# Patient Record
Sex: MALE | Race: WHITE | NOT HISPANIC OR LATINO | Employment: UNEMPLOYED | URBAN - METROPOLITAN AREA
[De-identification: names, ages, dates, MRNs, and addresses within clinical notes are randomized per-mention and may not be internally consistent; named-entity substitution may affect disease eponyms.]

---

## 2018-03-05 ENCOUNTER — OFFICE VISIT (OUTPATIENT)
Dept: URGENT CARE | Facility: CLINIC | Age: 28
End: 2018-03-05
Payer: COMMERCIAL

## 2018-03-05 VITALS
RESPIRATION RATE: 18 BRPM | OXYGEN SATURATION: 96 % | SYSTOLIC BLOOD PRESSURE: 98 MMHG | BODY MASS INDEX: 31.99 KG/M2 | WEIGHT: 216 LBS | HEART RATE: 107 BPM | HEIGHT: 69 IN | DIASTOLIC BLOOD PRESSURE: 62 MMHG | TEMPERATURE: 100 F

## 2018-03-05 DIAGNOSIS — R14.0 ABDOMINAL BLOATING: ICD-10-CM

## 2018-03-05 DIAGNOSIS — R09.81 NASAL CONGESTION: ICD-10-CM

## 2018-03-05 DIAGNOSIS — K52.9 GASTROENTERITIS: ICD-10-CM

## 2018-03-05 DIAGNOSIS — B34.9 VIRAL SYNDROME: Primary | ICD-10-CM

## 2018-03-05 LAB
BILIRUB UR QL STRIP: NEGATIVE
CTP QC/QA: YES
FLUAV AG NPH QL: NEGATIVE
FLUBV AG NPH QL: NEGATIVE
GLUCOSE UR QL STRIP: NEGATIVE
KETONES UR QL STRIP: NEGATIVE
LEUKOCYTE ESTERASE UR QL STRIP: POSITIVE
PH, POC UA: 7.5 (ref 5–8)
POC BLOOD, URINE: NEGATIVE
POC NITRATES, URINE: NEGATIVE
PROT UR QL STRIP: NEGATIVE
SP GR UR STRIP: 1.01 (ref 1–1.03)
UROBILINOGEN UR STRIP-ACNC: ABNORMAL (ref 0.3–2.2)

## 2018-03-05 PROCEDURE — 99203 OFFICE O/P NEW LOW 30 MIN: CPT | Mod: 25,S$GLB,, | Performed by: PHYSICIAN ASSISTANT

## 2018-03-05 PROCEDURE — 81003 URINALYSIS AUTO W/O SCOPE: CPT | Mod: QW,S$GLB,, | Performed by: PHYSICIAN ASSISTANT

## 2018-03-05 PROCEDURE — 87804 INFLUENZA ASSAY W/OPTIC: CPT | Mod: 59,QW,S$GLB, | Performed by: PHYSICIAN ASSISTANT

## 2018-03-05 RX ORDER — HYOSCYAMINE SULFATE 0.125 MG
125 TABLET ORAL EVERY 6 HOURS PRN
Qty: 20 TABLET | Refills: 0 | Status: SHIPPED | OUTPATIENT
Start: 2018-03-05 | End: 2018-07-10

## 2018-03-05 RX ORDER — DIPHENOXYLATE HYDROCHLORIDE AND ATROPINE SULFATE 2.5; .025 MG/1; MG/1
1 TABLET ORAL 4 TIMES DAILY PRN
Qty: 20 TABLET | Refills: 0 | Status: SHIPPED | OUTPATIENT
Start: 2018-03-05 | End: 2018-03-10

## 2018-03-05 RX ORDER — ONDANSETRON 8 MG/1
8 TABLET, ORALLY DISINTEGRATING ORAL EVERY 8 HOURS PRN
Qty: 15 TABLET | Refills: 0 | Status: SHIPPED | OUTPATIENT
Start: 2018-03-05 | End: 2018-03-10

## 2018-03-05 NOTE — LETTER
March 5, 2018      [facility] [place]  [address]  [facility] [address]  Phone: [phone]  Fax: [phone]       Patient: Dustin Schwab   YOB: 1990  Date of Visit: 03/05/2018    To Whom It May Concern:    Leo Schwab  was at [facility] on 03/05/2018. He may return to work/school on 3/6 , 3/7, or 3/8/2018 depending on how feeling with no restrictions. If you have any questions or concerns, or if I can be of further assistance, please do not hesitate to contact me.    Sincerely,       Raz Matute PA-C

## 2018-03-05 NOTE — PATIENT INSTRUCTIONS
"    - Please return here or go to the Emergency Department for any concerns or worsening of condition.   - If you were prescribed a narcotic medication, do not drive or operate heavy equipment or machinery while taking these medications.  - Please follow up with your primary care provider (PCP) or discussed specialist(s) as needed.           Viral Syndrome (Adult)  A viral illness may cause a number of symptoms. The symptoms depend on the part of the body that the virus affects. If it settles in your nose, throat, and lungs, it may cause cough, sore throat, congestion, and sometimes headache. If it settles in your stomach and intestinal tract, it may cause vomiting and diarrhea. Sometimes it causes vague symptoms like "aching all over," feeling tired, loss of appetite, or fever.  A viral illness usually lasts 1 to 2 weeks, but sometimes it lasts longer. In some cases, a more serious infection can look like a viral syndrome in the first few days of the illness. You may need another exam and additional tests to know the difference. Watch for the warning signs listed below.  Home care  Follow these guidelines for taking care of yourself at home:  · If symptoms are severe, rest at home for the first 2 to 3 days.  · Stay away from cigarette smoke - both your smoke and the smoke from others.  · You may use over-the-counter acetaminophen or ibuprofen for fever, muscle aching, and headache, unless another medicine was prescribed for this. If you have chronic liver or kidney disease or ever had a stomach ulcer or GI bleeding, talk with your doctor before using these medicines. No one who is younger than 18 and ill with a fever should take aspirin. It may cause severe disease or death.  · Your appetite may be poor, so a light diet is fine. Avoid dehydration by drinking 8 to 12 8-ounce glasses of fluids each day. This may include water; orange juice; lemonade; apple, grape, and cranberry juice; clear fruit drinks; electrolyte " replacement and sports drinks; and decaffeinated teas and coffee. If you have been diagnosed with a kidney disease, ask your doctor how much and what types of fluids you should drink to prevent dehydration. If you have kidney disease, drinking too much fluid can cause it build up in the your body and be dangerous to your health.  · Over-the-counter remedies won't shorten the length of the illness but may be helpful for cough, sore throat; and nasal and sinus congestion. Don't use decongestants if you have high blood pressure.  Follow-up care  Follow up with your healthcare provider if you do not improve over the next week.  Call 911  Get emergency medical care if any of the following occur:  · Convulsion  · Feeling weak, dizzy, or like you are going to faint  · Chest pain, shortness of breath, wheezing, or difficulty breathing  When to seek medical advice  Call your healthcare provider right away if any of these occur:  · Cough with lots of colored sputum (mucus) or blood in your sputum  · Chest pain, shortness of breath, wheezing, or difficulty breathing  · Severe headache; face, neck, or ear pain  · Severe, constant pain in the lower right side of your belly (abdominal)  · Continued vomiting (cant keep liquids down)  · Frequent diarrhea (more than 5 times a day); blood (red or black color) or mucus in diarrhea  · Feeling weak, dizzy, or like you are going to faint  · Extreme thirst  · Fever of 100.4°F (38°C) or higher, or as directed by your healthcare provider  Date Last Reviewed: 9/25/2015  © 8943-8668 [employer]. [address] [place] [address], [place] [address]. All rights reserved. This information is not intended as a substitute for professional medical care. Always follow your healthcare professional's instructions.              Food Poisoning or Viral Gastroenteritis (Adult)  You have a stomach illness that is likely either food poisoning or viral gastroenteritis.  Food poisoning is illness that is  passed along in food and affects the stomach and intestinal tract. It usually occurs from 1 to 24 hours after eating food that has spoiled. When it happens within a few hours of eating, it is often caused by toxins from bacteria in food that has not been cooked or refrigerated properly.  Viral gastroenteritis is also an illness from a virus that affects the stomach and intestinal tract. Many people call it the stomach flu, but it has nothing to do with influenza. In fact, it can happen from food poisoning, but it can also happen when germs are passed from person-to-person or contaminated surface (toothbrush, cutting board, toilet) to a person.  Either illness can cause these symptoms:  · Abdominal pain and cramping  · Nausea  · Vomiting  · Diarrhea  · Fever and chills  · Loss of bowel control  The symptoms of food poisoning usually last 1 to 2 days. The symptoms of viral gastroenteritis can sometimes last up to 7 days but usually end sooner.  Antibiotics are not effective for either illness.  Other causes of gastroenteritis include bacteria and parasites which are not discussed here.  Home care  Follow all instructions given by your healthcare provider. Rest at home for the next 24 hours, or until you feel better. Avoid caffeine, tobacco, and alcohol. These can make diarrhea, cramping, and pain worse.  If taking medicines:  · Dont take over-the-counter diarrhea or nausea medicines unless your healthcare provider tells you to.  · You may use acetaminophen or NSAID medicines like ibuprofen or naproxen to reduce pain and fever. Dont use these if you have chronic liver or kidney disease, or ever had a stomach ulcer or GI bleeding. Talk with your healthcare provider first. Don't use NSAID medicines if you are already taking one for another condition (like arthritis) or are on daily aspirin therapy (such as for heart disease or after a stroke).  To prevent the spread of illness:  · Remember that washing with soap and  water is the best way to prevent the spread of infection. Wash your hands before and after caring for a sick person.  · Clean the toilet after each use.  · Wash your hands or use alcohol-based hand  before eating.  · Wash your hands or use alcohol-based hand  before and after preparing food. Keep in mind that people with diarrhea or vomiting should not prepare food for others.  · Wash your hands or use alcohol-based hand  after using cutting boards, counter-tops, and knives (and other utensils) that have been in contact with raw foods.  · Wash and then peel fruits and vegetables.  · Keep uncooked meats away from cooked and ready-to-eat foods.  · Use a food thermometer when cooking. Cook poultry to at least 165°F (74°C). Cook ground meat (beef, veal, pork, lamb) to at least 160°F (71°C). Cook fresh beef, veal, lamb, and pork to at least 145°F (63°C).  · Dont eat raw or undercooked eggs (poached or armando side up), poultry, meat or unpasteurized milk and juices.  Food and drinks  The main goal while treating vomiting or diarrhea is to prevent dehydration. This is done by taking small amounts of liquids often.  · Keep in mind that liquids are more important than food right now.  · Drink only small amounts of liquids at a time.  · Dont force yourself to eat, especially if you are having cramping, vomiting, or diarrhea. Dont eat large amounts at a time, even if you are hungry.  · If you eat, avoid fatty, greasy, spicy, or fried foods.  · Dont eat dairy foods or drink milk if you have diarrhea. These can make diarrhea worse.  The first 24 hours you can try:  · Oral rehydration solutions, available at grocery stores and pharmacies. Sports drinks are not a good choice if you are very dehydrated. They have too much sugar and not enough electrolytes.  · Soft drinks without caffeine  · Ginger ale  · Water (plain or flavored)  · Decaf tea or coffee  · Clear broth, consommé, or bouillon  · Gelatin,  popsicles, or frozen fruit juice bars   The second 24 hours, if you are feeling better, you can add:  · Hot cereal, plain toast, bread, rolls, or crackers  · Plain noodles, rice, mashed potatoes, chicken noodle soup, or rice soup  · Unsweetened canned fruit (no pineapple)  · Bananas  As you recover:  · Limit fat intake to less than 15 grams per day. Dont eat margarine, butter, oils, mayonnaise, sauces, gravies, fried foods, peanut butter, meat, poultry, or fish.  · Limit fiber. Dont eat raw or cooked vegetables, fresh fruits except bananas, and bran cereals.  · Limit caffeine and chocolate.  · Dont use spices or seasonings except salt.  · Resume a normal diet over time, as you feel better and your symptoms improve.  · If the symptoms come back, go back to a simple diet or clear liquids.  Follow-up care  Follow up with your healthcare provider, or as advised. If a stool sample was taken or cultures were done, call the healthcare provider for the results as instructed.  Call 911  Call 911 if you have any of these symptoms:  · Trouble breathing  · Confusion  · Extreme drowsiness or trouble walking  · Loss of consciousness  · Rapid heart rate  · Chest pain  · Stiff neck  · Seizure  When to seek medical advice  Call your healthcare provider right away if any of these occur:  · Abdominal pain that gets worse  · Constant lower right abdominal pain  · Continued vomiting and inability to keep liquids down  · Diarrhea more than 5 times a day  · Blood in vomit or stool  · Dark urine or no urine for 8 hours, dry mouth and tongue, tiredness, weakness, or dizziness  · New rash  · You dont get better in 2 to 3 days  · Fever of 100.4°F (38°C) or higher that doesnt get lower with medicine  · You have new symptoms of arthritis  Date Last Reviewed: 1/3/2016  © 2782-8793 [employer]. [address] [place] [address], [place] [address]. All rights reserved. This information is not intended as a substitute for professional  medical care. Always follow your healthcare professional's instructions.

## 2018-03-05 NOTE — PROGRESS NOTES
"Subjective:       Patient ID: Dustin Schwab is a 27 y.o. male.    Vitals:  height is 5' 9" (1.753 m) and weight is 98 kg (216 lb). His tympanic temperature is 99.9 °F (37.7 °C). His blood pressure is 98/62 and his pulse is 107. His respiration is 18 and oxygen saturation is 96%.     Chief Complaint: Bloated (today )    This is a 27 y.o. male with History reviewed. No pertinent past medical history.    who presents today with a chief complaint of vomiting and abdominal bloating that started today.        Abdominal Cramping   This is a new problem. The current episode started today. The onset quality is sudden. The problem occurs intermittently. The problem has been unchanged. The pain is located in the periumbilical region. The pain is at a severity of 7/10. The pain is severe. The quality of the pain is a sensation of fullness. The abdominal pain does not radiate. Associated symptoms include constipation, diarrhea (no blood, pus, mucous, or rice water stools reported), flatus, myalgias, nausea and vomiting (no lito blood or coffee ground emesis reported). Pertinent negatives include no dysuria, fever, headaches, hematochezia or melena. The pain is aggravated by movement. The pain is relieved by vomiting. He has tried nothing for the symptoms.     Review of Systems   Constitution: Positive for chills, malaise/fatigue and night sweats. Negative for fever.   HENT: Positive for congestion. Negative for ear pain and sore throat.    Eyes: Negative for blurred vision, pain and visual disturbance.   Cardiovascular: Negative for chest pain, irregular heartbeat, near-syncope, palpitations and syncope.   Respiratory: Negative for cough, shortness of breath, sputum production and wheezing.    Hematologic/Lymphatic: Negative for adenopathy.   Skin: Negative for itching and rash.   Musculoskeletal: Positive for myalgias. Negative for back pain, joint pain, neck pain and stiffness.   Gastrointestinal: Positive for bloating, " constipation, diarrhea (no blood, pus, mucous, or rice water stools reported), flatus, nausea and vomiting (no lito blood or coffee ground emesis reported). Negative for abdominal pain, bowel incontinence, heartburn, hematemesis, hematochezia, hemorrhoids and melena.   Genitourinary: Negative for dysuria.   Neurological: Negative for dizziness, headaches, light-headedness, numbness, paresthesias and vertigo.   Psychiatric/Behavioral: Negative for altered mental status. The patient is not nervous/anxious.    Allergic/Immunologic: Negative for hives.   All other systems reviewed and are negative.      Objective:      Physical Exam   Constitutional: He is oriented to person, place, and time. He appears well-developed and well-nourished.  Non-toxic appearance. He has a sickly appearance. No distress.   HENT:   Head: Normocephalic and atraumatic.   Right Ear: Hearing, tympanic membrane, external ear and ear canal normal.   Left Ear: Hearing, tympanic membrane, external ear and ear canal normal.   Nose: Nose normal. No mucosal edema. Right sinus exhibits no maxillary sinus tenderness and no frontal sinus tenderness. Left sinus exhibits no maxillary sinus tenderness and no frontal sinus tenderness.   Mouth/Throat: Uvula is midline, oropharynx is clear and moist and mucous membranes are normal. No uvula swelling. No posterior oropharyngeal edema or posterior oropharyngeal erythema.   Eyes: Conjunctivae and lids are normal.   Neck: Trachea normal, normal range of motion and full passive range of motion without pain. Neck supple. No neck rigidity.   Cardiovascular: Normal rate, regular rhythm and normal heart sounds.    Pulmonary/Chest: Effort normal and breath sounds normal. No respiratory distress. He has no decreased breath sounds. He has no wheezes. He has no rhonchi. He has no rales.   Abdominal: Soft. Normal appearance. He exhibits no distension, no abdominal bruit, no pulsatile midline mass and no mass. Bowel sounds  are increased. There is no hepatosplenomegaly. There is generalized tenderness. There is no rigidity, no rebound, no guarding, no CVA tenderness, no tenderness at McBurney's point and negative Eason's sign. No hernia.   Musculoskeletal: Normal range of motion. He exhibits no edema.   Neurological: He is alert and oriented to person, place, and time. He has normal strength. He is not disoriented. Coordination and gait normal.   Skin: Skin is warm, dry and intact. He is not diaphoretic. No pallor.   Psychiatric: He has a normal mood and affect. His speech is normal and behavior is normal. Judgment and thought content normal. Cognition and memory are normal.   Nursing note and vitals reviewed.      Results for orders placed or performed in visit on 03/05/18   POCT Influenza A/B   Result Value Ref Range    Rapid Influenza A Ag Negative Negative    Rapid Influenza B Ag Negative Negative     Acceptable Yes    POCT Urinalysis, Dipstick, Automated, W/O Scope   Result Value Ref Range    POC Blood, Urine Negative Negative    POC Bilirubin, Urine Negative Negative    POC Urobilinogen, Urine norm 0.3 - 2.2    POC Ketones, Urine Negative Negative    POC Protein, Urine Negative Negative    POC Nitrates, Urine Negative Negative    POC Glucose, Urine Negative Negative    pH, UA 7.5 5 - 8    POC Specific Gravity, Urine 1.015 1.003 - 1.029    POC Leukocytes, Urine Positive (A) Negative      Assessment:       1. Viral syndrome    2. Gastroenteritis    3. Nasal congestion    4. Abdominal bloating        Plan:         Viral syndrome  -     POCT Influenza A/B    Gastroenteritis  -     hyoscyamine (ANASPAZ,LEVSIN) 0.125 mg Tab; Take 1 tablet (125 mcg total) by mouth every 6 (six) hours as needed (abdominal cramps/pain).  Dispense: 20 tablet; Refill: 0  -     ondansetron (ZOFRAN-ODT) 8 MG TbDL; Take 1 tablet (8 mg total) by mouth every 8 (eight) hours as needed (nausea or vomiting).  Dispense: 15 tablet; Refill: 0  -      diphenoxylate-atropine 2.5-0.025 mg (LOMOTIL) 2.5-0.025 mg per tablet; Take 1 tablet by mouth 4 (four) times daily as needed for Diarrhea.  Dispense: 20 tablet; Refill: 0    Nasal congestion    Abdominal bloating  -     POCT Urinalysis, Dipstick, Automated, W/O Scope    - Please return here or go to the Emergency Department for any concerns or worsening of condition.   - If you were prescribed a narcotic medication, do not drive or operate heavy equipment or machinery while taking these medications.  - Please follow up with your primary care provider (PCP) or discussed specialist(s) as needed.

## 2018-07-10 ENCOUNTER — OFFICE VISIT (OUTPATIENT)
Dept: INTERNAL MEDICINE | Facility: CLINIC | Age: 28
End: 2018-07-10
Payer: COMMERCIAL

## 2018-07-10 ENCOUNTER — LAB VISIT (OUTPATIENT)
Dept: LAB | Facility: HOSPITAL | Age: 28
End: 2018-07-10
Attending: INTERNAL MEDICINE
Payer: COMMERCIAL

## 2018-07-10 VITALS
BODY MASS INDEX: 31.91 KG/M2 | WEIGHT: 222.88 LBS | TEMPERATURE: 98 F | SYSTOLIC BLOOD PRESSURE: 121 MMHG | HEART RATE: 60 BPM | DIASTOLIC BLOOD PRESSURE: 72 MMHG | HEIGHT: 70 IN | RESPIRATION RATE: 16 BRPM

## 2018-07-10 DIAGNOSIS — Z00.00 ANNUAL PHYSICAL EXAM: ICD-10-CM

## 2018-07-10 DIAGNOSIS — S93.409A SPRAIN OF ANKLE, UNSPECIFIED LATERALITY, UNSPECIFIED LIGAMENT, INITIAL ENCOUNTER: ICD-10-CM

## 2018-07-10 DIAGNOSIS — Z00.00 ANNUAL PHYSICAL EXAM: Primary | ICD-10-CM

## 2018-07-10 LAB
ALBUMIN SERPL BCP-MCNC: 4.1 G/DL
ALP SERPL-CCNC: 60 U/L
ALT SERPL W/O P-5'-P-CCNC: 24 U/L
ANION GAP SERPL CALC-SCNC: 10 MMOL/L
AST SERPL-CCNC: 21 U/L
BASOPHILS # BLD AUTO: 0.03 K/UL
BASOPHILS NFR BLD: 0.6 %
BILIRUB SERPL-MCNC: 0.6 MG/DL
BUN SERPL-MCNC: 14 MG/DL
CALCIUM SERPL-MCNC: 9.8 MG/DL
CHLORIDE SERPL-SCNC: 105 MMOL/L
CHOLEST SERPL-MCNC: 174 MG/DL
CHOLEST/HDLC SERPL: 4 {RATIO}
CO2 SERPL-SCNC: 24 MMOL/L
CREAT SERPL-MCNC: 0.9 MG/DL
DIFFERENTIAL METHOD: ABNORMAL
EOSINOPHIL # BLD AUTO: 0.1 K/UL
EOSINOPHIL NFR BLD: 1.1 %
ERYTHROCYTE [DISTWIDTH] IN BLOOD BY AUTOMATED COUNT: 12.6 %
EST. GFR  (AFRICAN AMERICAN): >60 ML/MIN/1.73 M^2
EST. GFR  (NON AFRICAN AMERICAN): >60 ML/MIN/1.73 M^2
GLUCOSE SERPL-MCNC: 90 MG/DL
HCT VFR BLD AUTO: 45.3 %
HDLC SERPL-MCNC: 43 MG/DL
HDLC SERPL: 24.7 %
HGB BLD-MCNC: 16.2 G/DL
IMM GRANULOCYTES # BLD AUTO: 0.01 K/UL
IMM GRANULOCYTES NFR BLD AUTO: 0.2 %
LDLC SERPL CALC-MCNC: 110.6 MG/DL
LYMPHOCYTES # BLD AUTO: 1.9 K/UL
LYMPHOCYTES NFR BLD: 40.5 %
MCH RBC QN AUTO: 32.9 PG
MCHC RBC AUTO-ENTMCNC: 35.8 G/DL
MCV RBC AUTO: 92 FL
MONOCYTES # BLD AUTO: 0.5 K/UL
MONOCYTES NFR BLD: 9.5 %
NEUTROPHILS # BLD AUTO: 2.3 K/UL
NEUTROPHILS NFR BLD: 48.1 %
NONHDLC SERPL-MCNC: 131 MG/DL
NRBC BLD-RTO: 0 /100 WBC
PLATELET # BLD AUTO: 219 K/UL
PMV BLD AUTO: 11.2 FL
POTASSIUM SERPL-SCNC: 4.7 MMOL/L
PROT SERPL-MCNC: 7.2 G/DL
RBC # BLD AUTO: 4.92 M/UL
SODIUM SERPL-SCNC: 139 MMOL/L
TRIGL SERPL-MCNC: 102 MG/DL
TSH SERPL DL<=0.005 MIU/L-ACNC: 1.12 UIU/ML
WBC # BLD AUTO: 4.72 K/UL

## 2018-07-10 PROCEDURE — 99385 PREV VISIT NEW AGE 18-39: CPT | Mod: S$GLB,,, | Performed by: INTERNAL MEDICINE

## 2018-07-10 PROCEDURE — 84443 ASSAY THYROID STIM HORMONE: CPT

## 2018-07-10 PROCEDURE — 99999 PR PBB SHADOW E&M-EST. PATIENT-LVL III: CPT | Mod: PBBFAC,,, | Performed by: INTERNAL MEDICINE

## 2018-07-10 PROCEDURE — 36415 COLL VENOUS BLD VENIPUNCTURE: CPT | Mod: PO

## 2018-07-10 PROCEDURE — 80053 COMPREHEN METABOLIC PANEL: CPT

## 2018-07-10 PROCEDURE — 85025 COMPLETE CBC W/AUTO DIFF WBC: CPT

## 2018-07-10 PROCEDURE — 80061 LIPID PANEL: CPT

## 2018-07-10 NOTE — PROGRESS NOTES
Subjective:       Patient ID: Dustin Schwab is a 28 y.o. male.    Chief Complaint: Annual Exam    HPI     28 y.o. male here for annual exam.     Cholesterol: needs  Vaccines: Influenza - done; Tetanus - 2-2.5 yrs ago  Sexual Screening: active  STD screening: no concern  Eye exam: done last not sure  Prostate: Father and GF with prostate cancer  Colonoscopy: no family history of cancer    Exercise: he is on his feet all day running around.  About 6 months ago, he was doing P90x and stopped because of a rolled ankle.  He rolls his ankle once every 6 months.    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  At home - eating out and home cooked. Drinks water.    History reviewed. No pertinent past medical history.  History reviewed. No pertinent surgical history.  Social History     Social History    Marital status:      Spouse name: N/A    Number of children: N/A    Years of education: N/A     Occupational History    Not on file.     Social History Main Topics    Smoking status: Never Smoker    Smokeless tobacco: Never Used    Alcohol use Yes      Comment: 1-2 times a month    Drug use: No    Sexual activity: Yes     Other Topics Concern    Not on file     Social History Narrative    No narrative on file     Review of patient's allergies indicates:  No Known Allergies  Mr. Schwab had no medications administered during this visit.    Review of Systems   Constitutional: Negative for chills, fever and unexpected weight change.   HENT: Negative for congestion, postnasal drip and sore throat.    Eyes: Negative for redness and visual disturbance.   Respiratory: Negative for cough and shortness of breath.    Cardiovascular: Negative for chest pain and palpitations.   Gastrointestinal: Negative for abdominal pain, constipation, diarrhea, nausea and vomiting.   Genitourinary: Negative for dysuria, frequency and hematuria.   Musculoskeletal: Negative for arthralgias and myalgias.    Skin: Negative for color change and rash.   Neurological: Negative for dizziness and headaches.       Objective:      Physical Exam   Constitutional: He is oriented to person, place, and time. He appears well-developed and well-nourished.   HENT:   Head: Normocephalic and atraumatic.   Mouth/Throat: No oropharyngeal exudate.   Eyes: EOM are normal. Pupils are equal, round, and reactive to light. Right eye exhibits no discharge. Left eye exhibits no discharge. No scleral icterus.   Neck: Normal range of motion. Neck supple. No tracheal deviation present. No thyromegaly present.   Cardiovascular: Normal rate, regular rhythm and normal heart sounds.  Exam reveals no gallop and no friction rub.    No murmur heard.  Pulmonary/Chest: Effort normal and breath sounds normal. No respiratory distress. He has no wheezes. He has no rales. He exhibits no tenderness.   Abdominal: Soft. Bowel sounds are normal. He exhibits no distension and no mass. There is no tenderness. There is no rebound and no guarding.   Musculoskeletal: Normal range of motion. He exhibits no edema or tenderness.   Neurological: He is alert and oriented to person, place, and time.   Skin: Skin is warm and dry. No rash noted. No erythema. No pallor.   Psychiatric: He has a normal mood and affect. His behavior is normal.   Vitals reviewed.      Assessment:       1. Annual physical exam    2. Sprain of ankle, unspecified laterality, unspecified ligament, initial encounter        Plan:       1.  Check CBC, CMP, TSH, lipids.  Discussed exercise with patient.  When able, patient will get back to exercising.  Up-to-date on vaccines.  No need for colon or prostate cancer screening at.  2.  Advised patient to call back in 2 weeks for referral to physical therapy.  Wrote patient a prescription for walking boot.

## 2019-09-09 ENCOUNTER — OFFICE VISIT (OUTPATIENT)
Dept: URGENT CARE | Facility: CLINIC | Age: 29
End: 2019-09-09
Payer: COMMERCIAL

## 2019-09-09 VITALS
DIASTOLIC BLOOD PRESSURE: 64 MMHG | WEIGHT: 200 LBS | SYSTOLIC BLOOD PRESSURE: 113 MMHG | HEIGHT: 70 IN | OXYGEN SATURATION: 98 % | BODY MASS INDEX: 28.63 KG/M2 | TEMPERATURE: 98 F | HEART RATE: 68 BPM | RESPIRATION RATE: 16 BRPM

## 2019-09-09 DIAGNOSIS — S93.402A SPRAIN OF LEFT ANKLE, UNSPECIFIED LIGAMENT, INITIAL ENCOUNTER: Primary | ICD-10-CM

## 2019-09-09 DIAGNOSIS — S99.912A INJURY OF LEFT ANKLE, INITIAL ENCOUNTER: ICD-10-CM

## 2019-09-09 PROCEDURE — 73610 X-RAY EXAM OF ANKLE: CPT | Mod: LT,S$GLB,, | Performed by: RADIOLOGY

## 2019-09-09 PROCEDURE — 99214 OFFICE O/P EST MOD 30 MIN: CPT | Mod: S$GLB,,, | Performed by: EMERGENCY MEDICINE

## 2019-09-09 PROCEDURE — 99214 PR OFFICE/OUTPT VISIT, EST, LEVL IV, 30-39 MIN: ICD-10-PCS | Mod: S$GLB,,, | Performed by: EMERGENCY MEDICINE

## 2019-09-09 PROCEDURE — 73610 XR ANKLE COMPLETE 3 VIEW LEFT: ICD-10-PCS | Mod: LT,S$GLB,, | Performed by: RADIOLOGY

## 2019-09-09 NOTE — PATIENT INSTRUCTIONS
Go to the Emergency Room if symptoms or condition worsens in any way    Ibuprofen or Aleve as directed for pain for the next 5-7 days    Follow up at [facility] if not improved in 5-7 days    Sivakumar  [address] [facility]  [place] [id number] [phone]    Mark  [address]  [place] [address]  [phone]    Karey  [address]  [place] [address]  [phone]    [place]  [address]  [place] [address]  [phone]    Sarath  [address] [facility] [address]  [place] [address]        Ankle Sprain, with X-Ray   A sprain is an injury to the ligaments that hold the ankle joint together. There are no broken bones. Most sprains take about 4 to 6 weeks to heal. A severe sprain, where the ligament is completely torn, can take several months to recover.  Mild to moderate sprains may be treated with an elastic wrap or an in-shoe splint. This will provide support and prevent re-injury. A mild sprain may not need any additional support. A severe sprain may require surgery to repair. In some cases a cast or boot may be needed.  Home care  · Stay off your injured ankle as much as possible until you can walk on it without pain. If you have a lot of pain with walking, then crutches or a walker may be prescribed. These can be rented or purchased at many pharmacies and surgical or orthopedic supply stores. Follow your healthcare providers advice about when to begin bearing weight on that leg.  · Keep your leg elevated to reduce pain and swelling. When sleeping, place a pillow under your injured ankle. When sitting, support your injured ankle and leg so they are level with your waist. This is very important during the first 48 hours.  · Place an ice pack over the injured area for no more than 20 minutes. Do this every 3 to 6 hours for the first 24 to 48 hours, or as instructed. To make an ice pack, put ice cubes in a plastic bag that seals at the top. Wrap the bag in  a clean, thin towel or cloth. You can place the ice pack directly over the wrap, splint, or cast. Never place an ice pack directly on your skin. As the ice melts, be careful not to get any wrap, splint, or cast wet. Keep using ice packs as needed to ease pain and swelling.    · If you have a boot, open it to apply an ice pack, unless told otherwise by your provider. Wrap the ice pack in a clean, thin towel or cloth. Never put an ice pack directly on your skin.  · After 48 hours, it may also be helpful to apply heat for no more than 20 minutes, several times a day. You can do this with a heating pad or warm compress. Or you may want to go back and forth between using ice and heat. Never apply heat directly to the skin. Always wrap the heating pad or warm compress in a clean, thin towel or cloth.  · You may use over-the-counter pain medicine to ease pain, unless another pain medicine was prescribed. Talk with your provider before using these medicines if you have chronic liver or kidney disease, or have ever had a stomach ulcer or GI (gastrointestinal) bleeding.  · You may return to sports after your ankle heals, when you can run without pain.  · You are at risk of getting injured again for the first 6 weeks. During that time, protect your ankle with an in-shoe splint that prevents your ankle from tilting side to side. This is very important if you do active work or play sports during that time.  Follow-up care  Any X-rays you had today dont show any broken bones, breaks, or fractures. Bruises and sprains can sometimes hurt as much as a fracture. These injuries can take time to heal completely. If your symptoms dont improve or they get worse, talk with your healthcare provider. You may need a repeat X-ray.  When to seek medical advice  Call your healthcare provider right away if any of these occur:  · The plaster cast or splint gets wet or soft  · The fiberglass cast or splint gets wet and does not dry for 24  hours  · The pain or swelling increases, or redness appears  · The cast has a bad smell  · Fever of 100.4 F (38 C) or higher, or as directed  · Your toes become cold, blue, numb, or tingly  · You re-injure your ankle  Date Last Reviewed: 11/20/2015  © 6858-4904 [employer]. [address] [place] [address]. All rights reserved. This information is not intended as a substitute for professional medical care. Always follow your healthcare professional's instructions.

## 2019-09-09 NOTE — PROGRESS NOTES
"Subjective:       Patient ID: Dustin Schwab is a 29 y.o. male.    Vitals:    09/09/19 1825   BP: 113/64   Pulse: 68   Resp: 16   Temp: 97.8 °F (36.6 °C)   TempSrc: Oral   SpO2: 98%   Weight: 90.7 kg (200 lb)   Height: 5' 10" (1.778 m)       Chief Complaint: Ankle Injury (Left Ankle)    Patient states that he rolled ankle at work while walking up steps.Patient reports previous injury to same left ankle.    Ankle Injury    The incident occurred 3 to 6 hours ago. The incident occurred at work. The injury mechanism was an eversion injury. The pain is present in the left ankle. The pain is at a severity of 3/10. The pain has been constant since onset. Pertinent negatives include no numbness. The symptoms are aggravated by movement. He has tried ice and elevation (Ibuprofen 600mg) for the symptoms. The treatment provided mild relief.     Review of Systems   Constitution: Negative for malaise/fatigue.   HENT: Negative for nosebleeds.    Cardiovascular: Negative for chest pain and syncope.   Respiratory: Negative for shortness of breath.    Musculoskeletal: Negative for back pain, joint pain and neck pain.   Gastrointestinal: Negative for abdominal pain.   Genitourinary: Negative for hematuria.   Neurological: Negative for dizziness, numbness and weakness.       Objective:      Physical Exam   Constitutional: He is oriented to person, place, and time. He appears well-developed and well-nourished.   HENT:   Head: Normocephalic and atraumatic. Head is without abrasion, without contusion and without laceration.   Right Ear: External ear normal.   Left Ear: External ear normal.   Nose: Nose normal.   Mouth/Throat: Oropharynx is clear and moist.   Eyes: Pupils are equal, round, and reactive to light. Conjunctivae, EOM and lids are normal.   Neck: Trachea normal, full passive range of motion without pain and phonation normal. Neck supple.   Cardiovascular: Normal rate, regular rhythm and normal heart sounds. "   Pulmonary/Chest: Effort normal and breath sounds normal. No stridor. No respiratory distress.   Musculoskeletal:        Left ankle: He exhibits decreased range of motion. He exhibits no swelling, no ecchymosis, no deformity, no laceration and normal pulse. Tenderness. Medial malleolus tenderness found. No lateral malleolus, no AITFL, no CF ligament, no posterior TFL, no head of 5th metatarsal and no proximal fibula tenderness found. Achilles tendon normal.   Neuro-vascularly intact distal to extremity     Neurological: He is alert and oriented to person, place, and time.   Skin: Skin is warm, dry and intact. Capillary refill takes less than 2 seconds. No abrasion, no bruising, no burn, no ecchymosis, no laceration, no lesion and no rash noted. No erythema.   Psychiatric: He has a normal mood and affect. His speech is normal and behavior is normal. Judgment and thought content normal. Cognition and memory are normal.   Nursing note and vitals reviewed.      Assessment:       1. Sprain of left ankle, unspecified ligament, initial encounter    2. Injury of left ankle, initial encounter        Plan:       Dustin was seen today for ankle injury.    Diagnoses and all orders for this visit:    Sprain of left ankle, unspecified ligament, initial encounter  -     INELASTIC VELCRO WRAP    Injury of left ankle, initial encounter  -     X-Ray Ankle Complete Left; Future    X-ray Ankle Complete Left    Result Date: 9/9/2019  EXAMINATION: XR ANKLE COMPLETE 3 VIEW LEFT CLINICAL HISTORY: Unspecified injury of left ankle, initial encounter TECHNIQUE: AP, lateral and oblique views of the left ankle were performed. COMPARISON: None FINDINGS: There is a faint cortical lucency at the anteromedial aspect of the medial malleolus of the distal tibia.  This could represent a nondisplaced fracture but could also be related to a remote injury.  Clinical correlation regarding the site of the patient's pain is suggested.  No significant soft  tissue swelling is identified. Otherwise, nothing to suggest acute fracture.     As above. This report was flagged in Epic as abnormal. Electronically signed by: Destiny Tompkins MD Date:    09/09/2019 Time:    18:49    Medical decision making:  Patient's clinical exam does not support an acute ankle fracture.  Patient will be placed in an Ace wrap he is ambulatory without difficulty at this time does not require crutches.  Patient will follow up in occupational medicine clinic if not improved in 1 week for re-evaluation.    Patient Instructions   Go to the Emergency Room if symptoms or condition worsens in any way    Ibuprofen or Aleve as directed for pain for the next 5-7 days    Follow up at [facility] if not improved in 5-7 days    Sivakumar  [address] [facility]  [place] [address]  [phone]    Mark  [address] [facility]  [place] [address]  [phone]    Karey  [address] [address]  [phone]    [place]  [address]  [facility] [address]  [place] [address]  [phone]    Sarath  [address] [facility] [place] [address]        Ankle Sprain, with X-Ray   A sprain is an injury to the ligaments that hold the ankle joint together. There are no broken bones. Most sprains take about 4 to 6 weeks to heal. A severe sprain, where the ligament is completely torn, can take several months to recover.  Mild to moderate sprains may be treated with an elastic wrap or an in-shoe splint. This will provide support and prevent re-injury. A mild sprain may not need any additional support. A severe sprain may require surgery to repair. In some cases a cast or boot may be needed.  Home care  · Stay off your injured ankle as much as possible until you can walk on it without pain. If you have a lot of pain with walking, then crutches or a walker may be prescribed. These can be rented or purchased at many pharmacies and surgical or orthopedic supply stores. Follow your  healthcare providers advice about when to begin bearing weight on that leg.  · Keep your leg elevated to reduce pain and swelling. When sleeping, place a pillow under your injured ankle. When sitting, support your injured ankle and leg so they are level with your waist. This is very important during the first 48 hours.  · Place an ice pack over the injured area for no more than 20 minutes. Do this every 3 to 6 hours for the first 24 to 48 hours, or as instructed. To make an ice pack, put ice cubes in a plastic bag that seals at the top. Wrap the bag in a clean, thin towel or cloth. You can place the ice pack directly over the wrap, splint, or cast. Never place an ice pack directly on your skin. As the ice melts, be careful not to get any wrap, splint, or cast wet. Keep using ice packs as needed to ease pain and swelling.    · If you have a boot, open it to apply an ice pack, unless told otherwise by your provider. Wrap the ice pack in a clean, thin towel or cloth. Never put an ice pack directly on your skin.  · After 48 hours, it may also be helpful to apply heat for no more than 20 minutes, several times a day. You can do this with a heating pad or warm compress. Or you may want to go back and forth between using ice and heat. Never apply heat directly to the skin. Always wrap the heating pad or warm compress in a clean, thin towel or cloth.  · You may use over-the-counter pain medicine to ease pain, unless another pain medicine was prescribed. Talk with your provider before using these medicines if you have chronic liver or kidney disease, or have ever had a stomach ulcer or GI (gastrointestinal) bleeding.  · You may return to sports after your ankle heals, when you can run without pain.  · You are at risk of getting injured again for the first 6 weeks. During that time, protect your ankle with an in-shoe splint that prevents your ankle from tilting side to side. This is very important if you do active work or  play sports during that time.  Follow-up care  Any X-rays you had today dont show any broken bones, breaks, or fractures. Bruises and sprains can sometimes hurt as much as a fracture. These injuries can take time to heal completely. If your symptoms dont improve or they get worse, talk with your healthcare provider. You may need a repeat X-ray.  When to seek medical advice  Call your healthcare provider right away if any of these occur:  · The plaster cast or splint gets wet or soft  · The fiberglass cast or splint gets wet and does not dry for 24 hours  · The pain or swelling increases, or redness appears  · The cast has a bad smell  · Fever of 100.4 F (38 C) or higher, or as directed  · Your toes become cold, blue, numb, or tingly  · You re-injure your ankle  Date Last Reviewed: 11/20/2015  © 0141-2481 [employer]. [address] [place] [address], [place] [address]. All rights reserved. This information is not intended as a substitute for professional medical care. Always follow your healthcare professional's instructions.

## 2019-09-09 NOTE — LETTER
[facility] [address] [place]camp[place] [facility] [address]  Phone: [phone]  Fax: [phone]  [facility]: [email or web address]    Pt Name: Dustin Schwab  Injury Date: 09/09/2019   Employee ID:  Date of First Treatment: 09/09/2019   Company: [employer] reference to record [employer]       Appointment Time: 05:55 PM Arrived: 1817   Provider: Roel Dior MD Time Out:2650     Office Treatment:   1. Sprain of left ankle, unspecified ligament, initial encounter    2. Injury of left ankle, initial encounter                     Return Appointment:  In 1 week at [facility] if symptoms persist (see below       Patient may return to work 09/10/2019 with the following restrictions:  Patient is to sit for 75% of shift, limit walking and standing for the next 7 days    Tylenol and or Aleve OTC for pain as directed            Follow up at [facility] if not improved in 5-7 days    Sivakumar  [address] [place] [address]  [phone]    Babylon  [address] [facility]  [place] [address]  [phone]    Karey  [address] [address]  [phone]    [place] [address]  [place] [address]  [phone]    Sraath  [address]  [place] [address]

## 2019-12-20 NOTE — PROGRESS NOTES
Subjective:       Patient ID: Dustin Schwab is a 29 y.o. male.    Chief Complaint: Breast Mass    HPI     29-year-old male here for evaluation of lumps on the chest underneath the skin.  He has had lumps on his chest a couple years ago and was told that they were nothing.  He was supposed to have this removed years ago, but did not.  One is changing in size - getting bigger.  No pain or drainage.  He was told that these are dysfunctional oil glands a couple years ago.    Review of Systems   Constitutional: Negative for activity change and unexpected weight change.   HENT: Negative for hearing loss, rhinorrhea and trouble swallowing.    Eyes: Negative for discharge and visual disturbance.   Respiratory: Negative for chest tightness and wheezing.    Cardiovascular: Negative for chest pain and palpitations.   Gastrointestinal: Negative for blood in stool, constipation, diarrhea and vomiting.   Endocrine: Negative for polydipsia and polyuria.   Genitourinary: Negative for difficulty urinating, hematuria and urgency.   Musculoskeletal: Negative for arthralgias, joint swelling and neck pain.   Neurological: Negative for weakness and headaches.   Psychiatric/Behavioral: Negative for confusion and dysphoric mood.       Objective:      Physical Exam   Constitutional: He is oriented to person, place, and time. He appears well-developed and well-nourished.   HENT:   Head: Normocephalic and atraumatic.   Mouth/Throat: No oropharyngeal exudate.   Eyes: Pupils are equal, round, and reactive to light. EOM are normal. Right eye exhibits no discharge. Left eye exhibits no discharge. No scleral icterus.   Neck: Normal range of motion. Neck supple. No tracheal deviation present. No thyromegaly present.   Cardiovascular: Normal rate, regular rhythm and normal heart sounds. Exam reveals no gallop and no friction rub.   No murmur heard.  Pulmonary/Chest: Effort normal and breath sounds normal. No respiratory distress. He has no  wheezes. He has no rales. He exhibits no tenderness.       Abdominal: Soft. Bowel sounds are normal. He exhibits no distension and no mass. There is no tenderness. There is no rebound and no guarding.   Musculoskeletal: Normal range of motion. He exhibits no edema or tenderness.   Neurological: He is alert and oriented to person, place, and time.   Skin: Skin is warm and dry. No rash noted. No erythema. No pallor.   Psychiatric: He has a normal mood and affect. His behavior is normal.   Vitals reviewed.      Assessment:       1. Mass of skin of chest        Plan:       1.  Refer to General surgery for evaluation.

## 2019-12-23 ENCOUNTER — OFFICE VISIT (OUTPATIENT)
Dept: INTERNAL MEDICINE | Facility: CLINIC | Age: 29
End: 2019-12-23
Payer: COMMERCIAL

## 2019-12-23 VITALS
HEIGHT: 70 IN | RESPIRATION RATE: 14 BRPM | BODY MASS INDEX: 31.25 KG/M2 | TEMPERATURE: 98 F | SYSTOLIC BLOOD PRESSURE: 124 MMHG | HEART RATE: 68 BPM | DIASTOLIC BLOOD PRESSURE: 62 MMHG | WEIGHT: 218.25 LBS

## 2019-12-23 DIAGNOSIS — R22.2 MASS OF SKIN OF CHEST: Primary | ICD-10-CM

## 2019-12-23 PROCEDURE — 99213 PR OFFICE/OUTPT VISIT, EST, LEVL III, 20-29 MIN: ICD-10-PCS | Mod: S$GLB,,, | Performed by: INTERNAL MEDICINE

## 2019-12-23 PROCEDURE — 99213 OFFICE O/P EST LOW 20 MIN: CPT | Mod: S$GLB,,, | Performed by: INTERNAL MEDICINE

## 2019-12-23 PROCEDURE — 99999 PR PBB SHADOW E&M-EST. PATIENT-LVL III: ICD-10-PCS | Mod: PBBFAC,,, | Performed by: INTERNAL MEDICINE

## 2019-12-23 PROCEDURE — 3008F PR BODY MASS INDEX (BMI) DOCUMENTED: ICD-10-PCS | Mod: CPTII,S$GLB,, | Performed by: INTERNAL MEDICINE

## 2019-12-23 PROCEDURE — 99999 PR PBB SHADOW E&M-EST. PATIENT-LVL III: CPT | Mod: PBBFAC,,, | Performed by: INTERNAL MEDICINE

## 2019-12-23 PROCEDURE — 3008F BODY MASS INDEX DOCD: CPT | Mod: CPTII,S$GLB,, | Performed by: INTERNAL MEDICINE

## 2019-12-26 ENCOUNTER — OFFICE VISIT (OUTPATIENT)
Dept: SURGERY | Facility: CLINIC | Age: 29
End: 2019-12-26
Payer: COMMERCIAL

## 2019-12-26 VITALS
WEIGHT: 215.25 LBS | DIASTOLIC BLOOD PRESSURE: 71 MMHG | BODY MASS INDEX: 30.82 KG/M2 | HEIGHT: 70 IN | OXYGEN SATURATION: 98 % | TEMPERATURE: 97 F | SYSTOLIC BLOOD PRESSURE: 104 MMHG | HEART RATE: 66 BPM

## 2019-12-26 DIAGNOSIS — R22.2 SUBCUTANEOUS NODULE OF CHEST WALL: Primary | ICD-10-CM

## 2019-12-26 PROCEDURE — 99203 OFFICE O/P NEW LOW 30 MIN: CPT | Mod: S$GLB,,, | Performed by: SURGERY

## 2019-12-26 PROCEDURE — 99999 PR PBB SHADOW E&M-EST. PATIENT-LVL III: CPT | Mod: PBBFAC,,, | Performed by: SURGERY

## 2019-12-26 PROCEDURE — 3008F BODY MASS INDEX DOCD: CPT | Mod: CPTII,S$GLB,, | Performed by: SURGERY

## 2019-12-26 PROCEDURE — 99203 PR OFFICE/OUTPT VISIT, NEW, LEVL III, 30-44 MIN: ICD-10-PCS | Mod: S$GLB,,, | Performed by: SURGERY

## 2019-12-26 PROCEDURE — 3008F PR BODY MASS INDEX (BMI) DOCUMENTED: ICD-10-PCS | Mod: CPTII,S$GLB,, | Performed by: SURGERY

## 2019-12-26 PROCEDURE — 99999 PR PBB SHADOW E&M-EST. PATIENT-LVL III: ICD-10-PCS | Mod: PBBFAC,,, | Performed by: SURGERY

## 2019-12-26 NOTE — LETTER
January 7, 2020      Jose Carlos Ocampo MD  [address] [facility]  [place] [address]           [facility]  [address]  [facility] [address]  Phone: [phone]          Patient: Dustin Schwab   MR Number: [id number]   YOB: 1990   Date of Visit: 12/26/2019       Dear Dr. Jose Carlos Ocampo:    Thank you for referring Dustin Schwab to me for evaluation. Attached you will find relevant portions of my assessment and plan of care.    If you have questions, please do not hesitate to call me. I look forward to following Dustin cShwab along with you.    Sincerely,    Luis Espinal Jr., MD    Enclosure  CC:  No Recipients    If you would like to receive this communication electronically, please contact [email or web address] or [phone] to request more information on [employer] access.    For providers and/or their staff who would like to refer a patient to [facility], please contact us through our one-stop-shop provider referral line, [facility], at [phone].    If you feel you have received this communication in error or would no longer like to receive these types of communications, please e-mail [email or web address]

## 2019-12-26 NOTE — PROGRESS NOTES
Sivakumar - General Surgery  General Surgery  History & Physical      Subjective:     Chief Complaint: mass in central chest    History of Present Illness:  Patient is a 29 y.o. male with who presents to clinic today for evaluation of mass in center of chest. Present for at least 3 years. Feels like it has gotten larger in size. Was seen in 2016 at [facility], diagnosed with sebaceous cyst, give PO abx. Mass has never drained.     PMH: none  PSH: none  Family history: non contributory    Review of systems: see HPI    Current Outpatient Medications on File Prior to Visit   Medication Sig    FLUCELVAX QUAD [id number], PF, 60 mcg (15 mcg x 4)/0.5 mL Syrg      No current facility-administered medications on file prior to visit.        Review of patient's allergies indicates:  No Known Allergies    Objective:     Vital Signs (Most Recent):  Temp: 97.4 °F (36.3 °C) (12/26/19 1026)  Pulse: 66 (12/26/19 1026)  BP: 104/71 (12/26/19 1026)  SpO2: 98 % (12/26/19 1026)     Weight: 97.7 kg (215 lb 4.5 oz)  Body mass index is 30.89 kg/m².    Physical exam:  General: no acute distress  Neuro: awake, alert, oriented x3  Chest: small, mobile rubbery mass in center of chest 1 cm, likely sebaceous cyst  Cardio: RRR, 2+ radial   Resp: Moving air appropriately, breathing even and unlabored  Abd: Soft, non-tender, non-distended, no palpable masses  Ext: Warm and well perfused    Assessment/Plan:   29 y.o. male with non-infected sebaceous cyst in central chest.     - not interested in excision, okay with observation at this time  - if desires excision, will call back to schedule    Jasmine Ray MD  General Surgery, PGY-IV

## 2020-06-24 ENCOUNTER — TELEPHONE (OUTPATIENT)
Dept: INTERNAL MEDICINE | Facility: CLINIC | Age: 30
End: 2020-06-24

## 2020-06-24 NOTE — TELEPHONE ENCOUNTER
Received records from [facility] emergency room.  Patient was seen for loss of consciousness.  CBC within normal limits, urine tox screen negative, CMP within normal limits, COVID negative, EKG normal sinus with no ST elevation, CT head unremarkable.    Patient was discharged in stable condition.    Please offer follow-up.

## 2020-07-20 ENCOUNTER — OFFICE VISIT (OUTPATIENT)
Dept: SURGERY | Facility: CLINIC | Age: 30
End: 2020-07-20
Payer: COMMERCIAL

## 2020-07-20 VITALS
DIASTOLIC BLOOD PRESSURE: 76 MMHG | WEIGHT: 207 LBS | HEIGHT: 70 IN | BODY MASS INDEX: 29.63 KG/M2 | HEART RATE: 64 BPM | SYSTOLIC BLOOD PRESSURE: 119 MMHG | TEMPERATURE: 99 F

## 2020-07-20 DIAGNOSIS — L72.3 INFECTED SEBACEOUS CYST: Primary | ICD-10-CM

## 2020-07-20 DIAGNOSIS — L08.9 INFECTED SEBACEOUS CYST: Primary | ICD-10-CM

## 2020-07-20 PROCEDURE — 99999 PR PBB SHADOW E&M-EST. PATIENT-LVL III: ICD-10-PCS | Mod: PBBFAC,,, | Performed by: SURGERY

## 2020-07-20 PROCEDURE — 99212 OFFICE O/P EST SF 10 MIN: CPT | Mod: S$GLB,,, | Performed by: SURGERY

## 2020-07-20 PROCEDURE — 99212 PR OFFICE/OUTPT VISIT, EST, LEVL II, 10-19 MIN: ICD-10-PCS | Mod: S$GLB,,, | Performed by: SURGERY

## 2020-07-20 PROCEDURE — 99999 PR PBB SHADOW E&M-EST. PATIENT-LVL III: CPT | Mod: PBBFAC,,, | Performed by: SURGERY

## 2020-07-20 PROCEDURE — 3008F PR BODY MASS INDEX (BMI) DOCUMENTED: ICD-10-PCS | Mod: CPTII,S$GLB,, | Performed by: SURGERY

## 2020-07-20 PROCEDURE — 3008F BODY MASS INDEX DOCD: CPT | Mod: CPTII,S$GLB,, | Performed by: SURGERY

## 2020-07-20 RX ORDER — SULFAMETHOXAZOLE AND TRIMETHOPRIM 800; 160 MG/1; MG/1
1 TABLET ORAL 2 TIMES DAILY
Qty: 20 TABLET | Refills: 0 | Status: SHIPPED | OUTPATIENT
Start: 2020-07-20 | End: 2020-07-30

## 2020-07-20 NOTE — PROGRESS NOTES
[facility]  PROGRESS NOTE    HPI: Dustin Schwab is a 30 y.o. male previously seen in clinic for anterior chest subcutaneous mass consistent with lipoma or sebaceous cyst.  At that time he deferred excision elected to observe.    INTERVAL HISTORY:  Had recent increase in size and tenderness over the past week.  No drainage.  No fevers or chills.  No spreading redness    VITALS:  Vitals:    07/20/20 1129   BP: 119/76   Pulse: 64   Temp: 98.8 °F (37.1 °C)       PHYSICAL EXAM:  GEN: NAD, AAOx3  HEENT:Anicteric sclera  CV:RRR  RESP:NLB  ABD:Soft  EXT:No edema    Physical Exam  Chest:          Comments: 3 x 4 cm sebaceous cyst with acute inflammation but no fluctuance, mild TTP, no surrounding erythema            ASSESSMENT & PLAN:  30 y.o. male infected sebaceous cyst of anterior chest wall  - sebaceous cyst with acute increase in size and inflammation however no fluctuance  - no hard indication for incision and drainage  - recommended Bactrim DS times 10 days min reassess  - patient should return to clinic in 3 weeks for likely cyst excision at that time if size has decreased and inflammation has resolved

## 2020-08-07 ENCOUNTER — OFFICE VISIT (OUTPATIENT)
Dept: SURGERY | Facility: CLINIC | Age: 30
End: 2020-08-07
Payer: COMMERCIAL

## 2020-08-07 VITALS
DIASTOLIC BLOOD PRESSURE: 70 MMHG | HEIGHT: 70 IN | SYSTOLIC BLOOD PRESSURE: 112 MMHG | HEART RATE: 63 BPM | WEIGHT: 206.44 LBS | TEMPERATURE: 99 F | BODY MASS INDEX: 29.55 KG/M2

## 2020-08-07 DIAGNOSIS — L72.3 INFLAMED SEBACEOUS CYST: Primary | ICD-10-CM

## 2020-08-07 PROCEDURE — 99999 PR PBB SHADOW E&M-EST. PATIENT-LVL III: CPT | Mod: PBBFAC,,, | Performed by: SURGERY

## 2020-08-07 PROCEDURE — 3008F BODY MASS INDEX DOCD: CPT | Mod: CPTII,S$GLB,, | Performed by: SURGERY

## 2020-08-07 PROCEDURE — 11404 EXC TR-EXT B9+MARG 3.1-4 CM: CPT | Mod: S$GLB,,, | Performed by: SURGERY

## 2020-08-07 PROCEDURE — 11404 PR EXC SKIN BENIG 3.1-4 CM TRUNK,ARM,LEG: ICD-10-PCS | Mod: S$GLB,,, | Performed by: SURGERY

## 2020-08-07 PROCEDURE — 3008F PR BODY MASS INDEX (BMI) DOCUMENTED: ICD-10-PCS | Mod: CPTII,S$GLB,, | Performed by: SURGERY

## 2020-08-07 PROCEDURE — 99999 PR PBB SHADOW E&M-EST. PATIENT-LVL III: ICD-10-PCS | Mod: PBBFAC,,, | Performed by: SURGERY

## 2020-08-07 NOTE — PROGRESS NOTES
[facility]  PROGRESS NOTE    HPI: Dustin Schwab is a 30 y.o. male with history of sebaceous cyst of the anterior chest.  Patient initially deferred excision however developed inflammation and infection.  Treated with p.o. antibiotics with improvement.    INTERVAL HISTORY:  Presents today for sebaceous cyst excision.  Most of the redness and swelling has decreased.  Still mildly tender to palpation.  No recent drainage.  Denies fevers or chills.    VITALS:  Vitals:    08/07/20 1016   BP: 112/70   Pulse: 63   Temp: 98.9 °F (37.2 °C)         PHYSICAL EXAM:  GEN:  Acute distress, alert orient x3  HEENT:  Anicteric sclera  CV:  Regular rate rhythm  RESP:  Nonlabored breathing  ABD:  Soft, nontender, nondistended  EXT:  No edema  CHEST:  3.5 3 cm soft tissue mass consistent with sebaceous cyst in the anterior chest just above the start of, mild edema and erythema but fluctuance      ASSESSMENT & PLAN:  30 y.o. male inflamed sebaceous cyst of anterior chest  - given the complicated nature of the sebaceous cyst patient agreed to proceed with surgical excision at bedside  - risks and benefits were discussed and consent was obtained  - cyst was excised, see procedure note below further details  - incision closed with interrupted nylon sutures due to the possible infected nature of the cyst, return to clinic in 2 weeks for suture removal  - wound care instructions given    08/07/2020    Dustin Schwab  [id number]    PROCEDURE PERFORMED:  Sebaceous cyst excision    PERFORMING SURGEON: Luis Espinal Jr    CONSENT:  The risks benefits and alternatives of the procedure were discussed with the patient. The patient was queried for questions and all concerns were addressed.  The patient provided written consent for the procedure.    ANESTHESIA:  Lidocaine 1% with epinephrine    INDICATION:  Inflamed sebaceous cyst    FINDINGS:  3.5 x 3 by 2 cm sebaceous cyst excised from the anterior chest    PROCEDURE IN  DETAIL:  After informed consent was obtained the patient's anterior chest was prepped and draped typical sterile fashion.  Time-out performed.  Lidocaine 1% with epinephrine was injected directly over the lesion.  We then made an elliptical sharp incision through the skin incorporating a draining sinus tract.  We then circumferentially dissected the cyst wall from the surrounding subcutaneous tissue.  There is significant amount of inflammation still present in the subcutaneous tissue.  Was also found to track somewhat superiorly and to the patient's left.  After adequate dissection had been performed we irrigated the wound.  Hemostasis was assured.  Given the fact that there is still ongoing inflammation and possible infectious process we closed the skin with 3-0 nylon sutures in interrupted fashion.  A bandage was then applied.    EBL:  Minimal    COMPLICATIONS:  None    CONDITION:  Stable    DISPO:  Return to clinic in 2 weeks for wound check and suture removal

## 2020-08-21 ENCOUNTER — OFFICE VISIT (OUTPATIENT)
Dept: SURGERY | Facility: CLINIC | Age: 30
End: 2020-08-21
Payer: COMMERCIAL

## 2020-08-21 VITALS
WEIGHT: 207.88 LBS | SYSTOLIC BLOOD PRESSURE: 99 MMHG | HEART RATE: 48 BPM | BODY MASS INDEX: 29.76 KG/M2 | HEIGHT: 70 IN | DIASTOLIC BLOOD PRESSURE: 71 MMHG

## 2020-08-21 DIAGNOSIS — Z87.2 HISTORY OF EXCISION OF EPIDERMAL INCLUSION CYST: Primary | ICD-10-CM

## 2020-08-21 DIAGNOSIS — Z98.890 HISTORY OF EXCISION OF EPIDERMAL INCLUSION CYST: Primary | ICD-10-CM

## 2020-08-21 PROCEDURE — 99024 PR POST-OP FOLLOW-UP VISIT: ICD-10-PCS | Mod: S$GLB,,, | Performed by: SURGERY

## 2020-08-21 PROCEDURE — 99999 PR PBB SHADOW E&M-EST. PATIENT-LVL III: ICD-10-PCS | Mod: PBBFAC,,, | Performed by: SURGERY

## 2020-08-21 PROCEDURE — 99999 PR PBB SHADOW E&M-EST. PATIENT-LVL III: CPT | Mod: PBBFAC,,, | Performed by: SURGERY

## 2020-08-21 PROCEDURE — 99024 POSTOP FOLLOW-UP VISIT: CPT | Mod: S$GLB,,, | Performed by: SURGERY

## 2020-08-21 NOTE — PROGRESS NOTES
[facility]  PROGRESS NOTE    HPI: Dustin Schwab is a 30 y.o. male status post excision of mid chest sebaceous cyst here for follow-up.  Denies any significant pain, drainage, redness or swelling.      VITALS:  Vitals:    08/21/20 0826   BP: 99/71   Pulse: (!) 48       PHYSICAL EXAM:  Well-healed mid chest excision site, sutures in place, possible small seroma but no significant erythema, pain or breakdown        ASSESSMENT & PLAN:  30 y.o. male s/p excision sebaceous cyst  - wound healed well  - no restriction  - return clinic p.r.n.

## 2021-01-04 ENCOUNTER — PATIENT MESSAGE (OUTPATIENT)
Dept: ADMINISTRATIVE | Facility: HOSPITAL | Age: 31
End: 2021-01-04

## 2021-01-05 ENCOUNTER — LAB VISIT (OUTPATIENT)
Dept: URGENT CARE | Facility: CLINIC | Age: 31
End: 2021-01-05
Payer: COMMERCIAL

## 2021-01-05 DIAGNOSIS — Z03.818 ENCOUNTER FOR OBSERVATION FOR SUSPECTED EXPOSURE TO OTHER BIOLOGICAL AGENTS RULED OUT: ICD-10-CM

## 2021-01-05 LAB — SARS-COV-2 RNA RESP QL NAA+PROBE: NOT DETECTED

## 2021-01-05 PROCEDURE — U0003 INFECTIOUS AGENT DETECTION BY NUCLEIC ACID (DNA OR RNA); SEVERE ACUTE RESPIRATORY SYNDROME CORONAVIRUS 2 (SARS-COV-2) (CORONAVIRUS DISEASE [COVID-19]), AMPLIFIED PROBE TECHNIQUE, MAKING USE OF HIGH THROUGHPUT TECHNOLOGIES AS DESCRIBED BY CMS-2020-01-R: HCPCS

## 2021-04-05 ENCOUNTER — PATIENT MESSAGE (OUTPATIENT)
Dept: ADMINISTRATIVE | Facility: HOSPITAL | Age: 31
End: 2021-04-05

## 2021-04-16 ENCOUNTER — PATIENT MESSAGE (OUTPATIENT)
Dept: RESEARCH | Facility: HOSPITAL | Age: 31
End: 2021-04-16

## 2021-07-06 ENCOUNTER — PATIENT MESSAGE (OUTPATIENT)
Dept: ADMINISTRATIVE | Facility: HOSPITAL | Age: 31
End: 2021-07-06

## 2021-10-04 ENCOUNTER — PATIENT MESSAGE (OUTPATIENT)
Dept: ADMINISTRATIVE | Facility: HOSPITAL | Age: 31
End: 2021-10-04

## 2022-01-18 ENCOUNTER — PATIENT MESSAGE (OUTPATIENT)
Dept: ADMINISTRATIVE | Facility: HOSPITAL | Age: 32
End: 2022-01-18
Payer: COMMERCIAL

## 2022-03-16 ENCOUNTER — PATIENT MESSAGE (OUTPATIENT)
Dept: ADMINISTRATIVE | Facility: HOSPITAL | Age: 32
End: 2022-03-16
Payer: COMMERCIAL

## 2022-09-01 ENCOUNTER — OFFICE VISIT (OUTPATIENT)
Dept: INTERNAL MEDICINE | Facility: CLINIC | Age: 32
End: 2022-09-01
Payer: COMMERCIAL

## 2022-09-01 ENCOUNTER — LAB VISIT (OUTPATIENT)
Dept: LAB | Facility: HOSPITAL | Age: 32
End: 2022-09-01
Attending: INTERNAL MEDICINE
Payer: COMMERCIAL

## 2022-09-01 VITALS
HEIGHT: 69 IN | WEIGHT: 217.81 LBS | OXYGEN SATURATION: 98 % | SYSTOLIC BLOOD PRESSURE: 112 MMHG | RESPIRATION RATE: 18 BRPM | HEART RATE: 88 BPM | BODY MASS INDEX: 32.26 KG/M2 | TEMPERATURE: 97 F | DIASTOLIC BLOOD PRESSURE: 82 MMHG

## 2022-09-01 DIAGNOSIS — Z11.4 ENCOUNTER FOR SCREENING FOR HIV: ICD-10-CM

## 2022-09-01 DIAGNOSIS — Z11.59 NEED FOR HEPATITIS C SCREENING TEST: ICD-10-CM

## 2022-09-01 DIAGNOSIS — Z00.00 ANNUAL PHYSICAL EXAM: Primary | ICD-10-CM

## 2022-09-01 DIAGNOSIS — Z00.00 ANNUAL PHYSICAL EXAM: ICD-10-CM

## 2022-09-01 LAB
ALBUMIN SERPL BCP-MCNC: 4.3 G/DL (ref 3.5–5.2)
ALP SERPL-CCNC: 57 U/L (ref 55–135)
ALT SERPL W/O P-5'-P-CCNC: 53 U/L (ref 10–44)
ANION GAP SERPL CALC-SCNC: 10 MMOL/L (ref 8–16)
AST SERPL-CCNC: 27 U/L (ref 10–40)
BASOPHILS # BLD AUTO: 0.02 K/UL (ref 0–0.2)
BASOPHILS NFR BLD: 0.5 % (ref 0–1.9)
BILIRUB SERPL-MCNC: 0.4 MG/DL (ref 0.1–1)
BUN SERPL-MCNC: 17 MG/DL (ref 6–20)
CALCIUM SERPL-MCNC: 9.8 MG/DL (ref 8.7–10.5)
CHLORIDE SERPL-SCNC: 107 MMOL/L (ref 95–110)
CHOLEST SERPL-MCNC: 194 MG/DL (ref 120–199)
CHOLEST/HDLC SERPL: 4.3 {RATIO} (ref 2–5)
CO2 SERPL-SCNC: 25 MMOL/L (ref 23–29)
CREAT SERPL-MCNC: 0.8 MG/DL (ref 0.5–1.4)
DIFFERENTIAL METHOD: NORMAL
EOSINOPHIL # BLD AUTO: 0 K/UL (ref 0–0.5)
EOSINOPHIL NFR BLD: 0.7 % (ref 0–8)
ERYTHROCYTE [DISTWIDTH] IN BLOOD BY AUTOMATED COUNT: 12.6 % (ref 11.5–14.5)
EST. GFR  (NO RACE VARIABLE): >60 ML/MIN/1.73 M^2
GLUCOSE SERPL-MCNC: 115 MG/DL (ref 70–110)
HCT VFR BLD AUTO: 42.8 % (ref 40–54)
HCV AB SERPL QL IA: NORMAL
HDLC SERPL-MCNC: 45 MG/DL (ref 40–75)
HDLC SERPL: 23.2 % (ref 20–50)
HGB BLD-MCNC: 14.7 G/DL (ref 14–18)
HIV 1+2 AB+HIV1 P24 AG SERPL QL IA: NORMAL
IMM GRANULOCYTES # BLD AUTO: 0.01 K/UL (ref 0–0.04)
IMM GRANULOCYTES NFR BLD AUTO: 0.2 % (ref 0–0.5)
LDLC SERPL CALC-MCNC: 116 MG/DL (ref 63–159)
LYMPHOCYTES # BLD AUTO: 1.3 K/UL (ref 1–4.8)
LYMPHOCYTES NFR BLD: 29.9 % (ref 18–48)
MCH RBC QN AUTO: 30.6 PG (ref 27–31)
MCHC RBC AUTO-ENTMCNC: 34.3 G/DL (ref 32–36)
MCV RBC AUTO: 89 FL (ref 82–98)
MONOCYTES # BLD AUTO: 0.4 K/UL (ref 0.3–1)
MONOCYTES NFR BLD: 9.1 % (ref 4–15)
NEUTROPHILS # BLD AUTO: 2.6 K/UL (ref 1.8–7.7)
NEUTROPHILS NFR BLD: 59.6 % (ref 38–73)
NONHDLC SERPL-MCNC: 149 MG/DL
NRBC BLD-RTO: 0 /100 WBC
PLATELET # BLD AUTO: 191 K/UL (ref 150–450)
PMV BLD AUTO: 10.8 FL (ref 9.2–12.9)
POTASSIUM SERPL-SCNC: 4 MMOL/L (ref 3.5–5.1)
PROT SERPL-MCNC: 7.3 G/DL (ref 6–8.4)
RBC # BLD AUTO: 4.8 M/UL (ref 4.6–6.2)
SODIUM SERPL-SCNC: 142 MMOL/L (ref 136–145)
TRIGL SERPL-MCNC: 165 MG/DL (ref 30–150)
TSH SERPL DL<=0.005 MIU/L-ACNC: 2.18 UIU/ML (ref 0.4–4)
WBC # BLD AUTO: 4.41 K/UL (ref 3.9–12.7)

## 2022-09-01 PROCEDURE — 86803 HEPATITIS C AB TEST: CPT | Performed by: INTERNAL MEDICINE

## 2022-09-01 PROCEDURE — 1159F PR MEDICATION LIST DOCUMENTED IN MEDICAL RECORD: ICD-10-PCS | Mod: CPTII,S$GLB,, | Performed by: INTERNAL MEDICINE

## 2022-09-01 PROCEDURE — 99999 PR PBB SHADOW E&M-EST. PATIENT-LVL III: ICD-10-PCS | Mod: PBBFAC,,, | Performed by: INTERNAL MEDICINE

## 2022-09-01 PROCEDURE — 36415 COLL VENOUS BLD VENIPUNCTURE: CPT | Mod: PO | Performed by: INTERNAL MEDICINE

## 2022-09-01 PROCEDURE — 84443 ASSAY THYROID STIM HORMONE: CPT | Performed by: INTERNAL MEDICINE

## 2022-09-01 PROCEDURE — 85025 COMPLETE CBC W/AUTO DIFF WBC: CPT | Performed by: INTERNAL MEDICINE

## 2022-09-01 PROCEDURE — 3008F BODY MASS INDEX DOCD: CPT | Mod: CPTII,S$GLB,, | Performed by: INTERNAL MEDICINE

## 2022-09-01 PROCEDURE — 3074F PR MOST RECENT SYSTOLIC BLOOD PRESSURE < 130 MM HG: ICD-10-PCS | Mod: CPTII,S$GLB,, | Performed by: INTERNAL MEDICINE

## 2022-09-01 PROCEDURE — 80061 LIPID PANEL: CPT | Performed by: INTERNAL MEDICINE

## 2022-09-01 PROCEDURE — 80053 COMPREHEN METABOLIC PANEL: CPT | Performed by: INTERNAL MEDICINE

## 2022-09-01 PROCEDURE — 1160F RVW MEDS BY RX/DR IN RCRD: CPT | Mod: CPTII,S$GLB,, | Performed by: INTERNAL MEDICINE

## 2022-09-01 PROCEDURE — 99395 PR PREVENTIVE VISIT,EST,18-39: ICD-10-PCS | Mod: S$GLB,,, | Performed by: INTERNAL MEDICINE

## 2022-09-01 PROCEDURE — 3008F PR BODY MASS INDEX (BMI) DOCUMENTED: ICD-10-PCS | Mod: CPTII,S$GLB,, | Performed by: INTERNAL MEDICINE

## 2022-09-01 PROCEDURE — 99395 PREV VISIT EST AGE 18-39: CPT | Mod: S$GLB,,, | Performed by: INTERNAL MEDICINE

## 2022-09-01 PROCEDURE — 1160F PR REVIEW ALL MEDS BY PRESCRIBER/CLIN PHARMACIST DOCUMENTED: ICD-10-PCS | Mod: CPTII,S$GLB,, | Performed by: INTERNAL MEDICINE

## 2022-09-01 PROCEDURE — 1159F MED LIST DOCD IN RCRD: CPT | Mod: CPTII,S$GLB,, | Performed by: INTERNAL MEDICINE

## 2022-09-01 PROCEDURE — 3079F DIAST BP 80-89 MM HG: CPT | Mod: CPTII,S$GLB,, | Performed by: INTERNAL MEDICINE

## 2022-09-01 PROCEDURE — 3079F PR MOST RECENT DIASTOLIC BLOOD PRESSURE 80-89 MM HG: ICD-10-PCS | Mod: CPTII,S$GLB,, | Performed by: INTERNAL MEDICINE

## 2022-09-01 PROCEDURE — 87389 HIV-1 AG W/HIV-1&-2 AB AG IA: CPT | Performed by: INTERNAL MEDICINE

## 2022-09-01 PROCEDURE — 99999 PR PBB SHADOW E&M-EST. PATIENT-LVL III: CPT | Mod: PBBFAC,,, | Performed by: INTERNAL MEDICINE

## 2022-09-01 PROCEDURE — 3074F SYST BP LT 130 MM HG: CPT | Mod: CPTII,S$GLB,, | Performed by: INTERNAL MEDICINE

## 2022-09-01 NOTE — PROGRESS NOTES
Subjective:       Patient ID: Dustin Schwab is a 32 y.o. male.    Chief Complaint: Annual Exam    HPI    32 y.o. male here for annual exam.     Cholesterol: needs  Vaccines: Influenza - 2021; Tetanus - 2019; COVID - 3 done  Sexual Screening: active  STD screening: no concern  Eye exam: not sure when done  Prostate: MGF and father with prostate cancer  Colonoscopy: no family history of cancer.    Exercise: not as much as he was at work.  Diet: home cooked, eating out, fast food (wife is pregnant).  Vacation right now.    History reviewed. No pertinent past medical history.  History reviewed. No pertinent surgical history.  Social History     Socioeconomic History    Marital status:    Tobacco Use    Smoking status: Never    Smokeless tobacco: Never   Substance and Sexual Activity    Alcohol use: Yes     Comment: I drink maybe once or twice a month    Drug use: Never    Sexual activity: Yes     Partners: Female     Birth control/protection: None     Comment: Pill     Social Determinants of Health     Financial Resource Strain: Low Risk     Difficulty of Paying Living Expenses: Not hard at all   Food Insecurity: No Food Insecurity    Worried About Running Out of Food in the Last Year: Never true    Ran Out of Food in the Last Year: Never true   Transportation Needs: No Transportation Needs    Lack of Transportation (Medical): No    Lack of Transportation (Non-Medical): No   Physical Activity: Sufficiently Active    Days of Exercise per Week: 5 days    Minutes of Exercise per Session: 30 min   Stress: Stress Concern Present    Feeling of Stress : To some extent   Social Connections: Unknown    Frequency of Communication with Friends and Family: Once a week    Frequency of Social Gatherings with Friends and Family: Once a week    Active Member of Clubs or Organizations: Yes    Attends Club or Organization Meetings: 1 to 4 times per year    Marital Status:    Housing Stability: Low Risk     Unable to  Pay for Housing in the Last Year: No    Number of Places Lived in the Last Year: 1    Unstable Housing in the Last Year: No     Review of patient's allergies indicates:  No Known Allergies  Dustin Schwab had no medications administered during this visit.      Review of Systems   Constitutional:  Negative for activity change and unexpected weight change.   HENT:  Negative for hearing loss, rhinorrhea and trouble swallowing.    Eyes:  Negative for discharge and visual disturbance.   Respiratory:  Negative for chest tightness and wheezing.    Cardiovascular:  Negative for chest pain and palpitations.   Gastrointestinal:  Negative for blood in stool, constipation, diarrhea and vomiting.   Endocrine: Negative for polydipsia and polyuria.   Genitourinary:  Negative for difficulty urinating, hematuria and urgency.   Musculoskeletal:  Negative for arthralgias, joint swelling and neck pain.   Neurological:  Negative for weakness and headaches.   Psychiatric/Behavioral:  Negative for confusion and dysphoric mood.        Objective:      Physical Exam  Vitals reviewed.   Constitutional:       Appearance: He is well-developed.   HENT:      Head: Normocephalic and atraumatic.      Mouth/Throat:      Pharynx: No oropharyngeal exudate.   Eyes:      General: No scleral icterus.        Right eye: No discharge.         Left eye: No discharge.      Pupils: Pupils are equal, round, and reactive to light.   Neck:      Thyroid: No thyromegaly.      Trachea: No tracheal deviation.   Cardiovascular:      Rate and Rhythm: Normal rate and regular rhythm.      Heart sounds: Normal heart sounds. No murmur heard.    No friction rub. No gallop.   Pulmonary:      Effort: Pulmonary effort is normal. No respiratory distress.      Breath sounds: Normal breath sounds. No wheezing or rales.   Chest:      Chest wall: No tenderness.   Abdominal:      General: Bowel sounds are normal. There is no distension.      Palpations: Abdomen is soft. There is  no mass.      Tenderness: There is no abdominal tenderness. There is no guarding or rebound.   Musculoskeletal:         General: No tenderness. Normal range of motion.      Cervical back: Normal range of motion and neck supple.   Skin:     General: Skin is warm and dry.      Coloration: Skin is not pale.      Findings: No erythema or rash.   Neurological:      Mental Status: He is alert and oriented to person, place, and time.   Psychiatric:         Behavior: Behavior normal.       Assessment:       1. Annual physical exam  - CBC Auto Differential; Future  - Comprehensive Metabolic Panel; Future  - TSH; Future  - Lipid Panel; Future    2. Need for hepatitis C screening test  - Hepatitis C Antibody; Future    3. Encounter for screening for HIV  - HIV 1/2 Ag/Ab (4th Gen); Future      Plan:       1. Check CBC, CMP, TSH diet and exercise.  Up-to-date on vaccines.  Recommend Tdap on because wife is pregnant.    2. Check hep C.    3. Check HIV.

## 2022-09-02 ENCOUNTER — HOSPITAL ENCOUNTER (OUTPATIENT)
Dept: RADIOLOGY | Facility: OTHER | Age: 32
Discharge: HOME | End: 2022-09-02
Attending: INTERNAL MEDICINE
Payer: COMMERCIAL

## 2022-09-02 ENCOUNTER — TELEPHONE (OUTPATIENT)
Dept: INTERNAL MEDICINE | Facility: CLINIC | Age: 32
End: 2022-09-02
Payer: COMMERCIAL

## 2022-09-02 DIAGNOSIS — R79.89 ELEVATED LFTS: Primary | ICD-10-CM

## 2022-09-02 DIAGNOSIS — R79.89 ELEVATED LFTS: ICD-10-CM

## 2022-09-02 PROCEDURE — 76705 ECHO EXAM OF ABDOMEN: CPT | Mod: TC

## 2022-09-02 PROCEDURE — 76705 US ABDOMEN LIMITED: ICD-10-PCS | Mod: 26,,, | Performed by: RADIOLOGY

## 2022-09-02 PROCEDURE — 76705 ECHO EXAM OF ABDOMEN: CPT | Mod: 26,,, | Performed by: RADIOLOGY

## 2022-09-02 NOTE — TELEPHONE ENCOUNTER
You are negative for HIV, hepatitis-C.  Thyroid function, electrolytes, kidney function, blood counts, overall cholesterol normal.  Triglycerides are elevated.  Regular exercise 5 days a week, 30 minutes a day will help as well as Mediterranean diet.  Your liver function is elevated.  I am ordering an ultrasound to evaluate further.    Mediterranean style diet:    Eat:  Olive oil, lean meats such as chicken and fish and only small servings of carbohydrates.   Olive oil and vinegar instead of low fat salad dressings.  Cook food in olive oil.  You can pan mascorro or saute fish and vegetables instead of boiling or baking.  Unsalted nuts for snacks. Walnuts, cashews, almonds, pecans and pistachios ( not peanuts). Try almond butter or cashew butter on toast or crackers.  Brown bread. You can also dip bread in olive oil and eat it as a snack or appetizer  Seasonal or frozen vegetables and fruits.     Avoid:  Saturated fats and deep fried foods. Also stay away from large servings of starches, sweets, desserts and sugary drinks (both sodas and fruit juices)

## 2023-04-25 LAB — CRC RECOMMENDATION EXT: NORMAL

## 2023-04-28 ENCOUNTER — PATIENT OUTREACH (OUTPATIENT)
Dept: ADMINISTRATIVE | Facility: HOSPITAL | Age: 33
End: 2023-04-28
Payer: COMMERCIAL

## 2024-09-20 ENCOUNTER — TELEPHONE (OUTPATIENT)
Dept: INTERNAL MEDICINE | Facility: CLINIC | Age: 34
End: 2024-09-20
Payer: COMMERCIAL

## 2024-10-02 ENCOUNTER — OFFICE VISIT (OUTPATIENT)
Dept: INTERNAL MEDICINE | Facility: CLINIC | Age: 34
End: 2024-10-02
Payer: COMMERCIAL

## 2024-10-02 ENCOUNTER — LAB VISIT (OUTPATIENT)
Dept: LAB | Facility: HOSPITAL | Age: 34
End: 2024-10-02
Attending: INTERNAL MEDICINE
Payer: COMMERCIAL

## 2024-10-02 VITALS
TEMPERATURE: 98 F | RESPIRATION RATE: 16 BRPM | BODY MASS INDEX: 33.33 KG/M2 | DIASTOLIC BLOOD PRESSURE: 76 MMHG | OXYGEN SATURATION: 97 % | WEIGHT: 225 LBS | HEIGHT: 69 IN | SYSTOLIC BLOOD PRESSURE: 114 MMHG | HEART RATE: 73 BPM

## 2024-10-02 DIAGNOSIS — Z00.00 ANNUAL PHYSICAL EXAM: ICD-10-CM

## 2024-10-02 DIAGNOSIS — Z00.00 ANNUAL PHYSICAL EXAM: Primary | ICD-10-CM

## 2024-10-02 DIAGNOSIS — Z23 NEED FOR VACCINATION: ICD-10-CM

## 2024-10-02 LAB
ALBUMIN SERPL BCP-MCNC: 4.4 G/DL (ref 3.5–5.2)
ALP SERPL-CCNC: 60 U/L (ref 55–135)
ALT SERPL W/O P-5'-P-CCNC: 29 U/L (ref 10–44)
ANION GAP SERPL CALC-SCNC: 10 MMOL/L (ref 8–16)
AST SERPL-CCNC: 23 U/L (ref 10–40)
BASOPHILS # BLD AUTO: 0.02 K/UL (ref 0–0.2)
BASOPHILS NFR BLD: 0.5 % (ref 0–1.9)
BILIRUB SERPL-MCNC: 0.5 MG/DL (ref 0.1–1)
BUN SERPL-MCNC: 16 MG/DL (ref 6–20)
CALCIUM SERPL-MCNC: 9.7 MG/DL (ref 8.7–10.5)
CHLORIDE SERPL-SCNC: 106 MMOL/L (ref 95–110)
CHOLEST SERPL-MCNC: 220 MG/DL (ref 120–199)
CHOLEST/HDLC SERPL: 4.7 {RATIO} (ref 2–5)
CO2 SERPL-SCNC: 24 MMOL/L (ref 23–29)
CREAT SERPL-MCNC: 1 MG/DL (ref 0.5–1.4)
DIFFERENTIAL METHOD BLD: NORMAL
EOSINOPHIL # BLD AUTO: 0 K/UL (ref 0–0.5)
EOSINOPHIL NFR BLD: 0.9 % (ref 0–8)
ERYTHROCYTE [DISTWIDTH] IN BLOOD BY AUTOMATED COUNT: 12.6 % (ref 11.5–14.5)
EST. GFR  (NO RACE VARIABLE): >60 ML/MIN/1.73 M^2
ESTIMATED AVG GLUCOSE: 111 MG/DL (ref 68–131)
GLUCOSE SERPL-MCNC: 94 MG/DL (ref 70–110)
HBA1C MFR BLD: 5.5 % (ref 4–5.6)
HCT VFR BLD AUTO: 45.7 % (ref 40–54)
HDLC SERPL-MCNC: 47 MG/DL (ref 40–75)
HDLC SERPL: 21.4 % (ref 20–50)
HGB BLD-MCNC: 15.4 G/DL (ref 14–18)
IMM GRANULOCYTES # BLD AUTO: 0.01 K/UL (ref 0–0.04)
IMM GRANULOCYTES NFR BLD AUTO: 0.2 % (ref 0–0.5)
LDLC SERPL CALC-MCNC: 139.4 MG/DL (ref 63–159)
LYMPHOCYTES # BLD AUTO: 1.5 K/UL (ref 1–4.8)
LYMPHOCYTES NFR BLD: 34.8 % (ref 18–48)
MCH RBC QN AUTO: 30.6 PG (ref 27–31)
MCHC RBC AUTO-ENTMCNC: 33.7 G/DL (ref 32–36)
MCV RBC AUTO: 91 FL (ref 82–98)
MONOCYTES # BLD AUTO: 0.3 K/UL (ref 0.3–1)
MONOCYTES NFR BLD: 7.7 % (ref 4–15)
NEUTROPHILS # BLD AUTO: 2.5 K/UL (ref 1.8–7.7)
NEUTROPHILS NFR BLD: 55.9 % (ref 38–73)
NONHDLC SERPL-MCNC: 173 MG/DL
NRBC BLD-RTO: 0 /100 WBC
PLATELET # BLD AUTO: 195 K/UL (ref 150–450)
PMV BLD AUTO: 10.7 FL (ref 9.2–12.9)
POTASSIUM SERPL-SCNC: 3.7 MMOL/L (ref 3.5–5.1)
PROT SERPL-MCNC: 7.5 G/DL (ref 6–8.4)
RBC # BLD AUTO: 5.03 M/UL (ref 4.6–6.2)
SODIUM SERPL-SCNC: 140 MMOL/L (ref 136–145)
TRIGL SERPL-MCNC: 168 MG/DL (ref 30–150)
TSH SERPL DL<=0.005 MIU/L-ACNC: 2.01 UIU/ML (ref 0.4–4)
WBC # BLD AUTO: 4.4 K/UL (ref 3.9–12.7)

## 2024-10-02 PROCEDURE — 80061 LIPID PANEL: CPT | Performed by: INTERNAL MEDICINE

## 2024-10-02 PROCEDURE — 80053 COMPREHEN METABOLIC PANEL: CPT | Performed by: INTERNAL MEDICINE

## 2024-10-02 PROCEDURE — 90656 IIV3 VACC NO PRSV 0.5 ML IM: CPT | Mod: S$GLB,,, | Performed by: INTERNAL MEDICINE

## 2024-10-02 PROCEDURE — 90471 IMMUNIZATION ADMIN: CPT | Mod: S$GLB,,, | Performed by: INTERNAL MEDICINE

## 2024-10-02 PROCEDURE — 99395 PREV VISIT EST AGE 18-39: CPT | Mod: 25,S$GLB,, | Performed by: INTERNAL MEDICINE

## 2024-10-02 PROCEDURE — 83036 HEMOGLOBIN GLYCOSYLATED A1C: CPT | Performed by: INTERNAL MEDICINE

## 2024-10-02 PROCEDURE — 3078F DIAST BP <80 MM HG: CPT | Mod: CPTII,S$GLB,, | Performed by: INTERNAL MEDICINE

## 2024-10-02 PROCEDURE — 36415 COLL VENOUS BLD VENIPUNCTURE: CPT | Mod: PO | Performed by: INTERNAL MEDICINE

## 2024-10-02 PROCEDURE — 1159F MED LIST DOCD IN RCRD: CPT | Mod: CPTII,S$GLB,, | Performed by: INTERNAL MEDICINE

## 2024-10-02 PROCEDURE — 3008F BODY MASS INDEX DOCD: CPT | Mod: CPTII,S$GLB,, | Performed by: INTERNAL MEDICINE

## 2024-10-02 PROCEDURE — 85025 COMPLETE CBC W/AUTO DIFF WBC: CPT | Performed by: INTERNAL MEDICINE

## 2024-10-02 PROCEDURE — 99999 PR PBB SHADOW E&M-EST. PATIENT-LVL III: CPT | Mod: PBBFAC,,, | Performed by: INTERNAL MEDICINE

## 2024-10-02 PROCEDURE — 1160F RVW MEDS BY RX/DR IN RCRD: CPT | Mod: CPTII,S$GLB,, | Performed by: INTERNAL MEDICINE

## 2024-10-02 PROCEDURE — 84443 ASSAY THYROID STIM HORMONE: CPT | Performed by: INTERNAL MEDICINE

## 2024-10-02 PROCEDURE — 3074F SYST BP LT 130 MM HG: CPT | Mod: CPTII,S$GLB,, | Performed by: INTERNAL MEDICINE

## 2024-10-02 RX ORDER — CETIRIZINE HYDROCHLORIDE 5 MG/1
5 TABLET ORAL DAILY
COMMUNITY

## 2024-10-02 NOTE — PROGRESS NOTES
Subjective:       Patient ID: Dustin Schwab is a 34 y.o. male.    Chief Complaint: Annual Exam    HPI    34 y.o. male here for annual exam.     Cholesterol: needs  Vaccines: Influenza - needs; Tetanus - 2022; COVID - 4 done  Sexual Screening: active  STD screening: no concern  Eye exam: done last 2022  Prostate: MGF with cancer  Colonoscopy: No family history of cancer    Exercise: plans to exercise.  Diet: tries to eat fast food as little as possible.  Has eaten out the last couple of months more than usual.  Tries to home cook.    History of Present Illness    CHIEF COMPLAINT:  Mr. Schwab presents today for annual exam.    FAMILY HISTORY:  He reports a family history of prostate cancer in his maternal grandfather. His maternal uncle recently passed away from a rare form of neuroendocrine plasma and esophageal cancer, diagnosed at stage four. He denies a family history of colon cancer.    SOCIAL HISTORY:  He consumes alcohol once or twice a month on average. He denies smoking and drug use. He is occasionally sexually active and denies any concerns about sexually transmitted diseases.    DIET AND EXERCISE:  He attempts to maintain a balanced diet but acknowledges room for improvement. He and his wife try to limit fast food consumption and prioritize home-cooked meals, though he admits to eating out more frequently recently, including catered food at work. He expresses a desire to improve his eating habits, particularly in light of having a child. His exercise habits are inconsistent. He obtained a country club membership with gym facilities earlier this year, intending to use it after work and swim laps, but has only utilized it 5-6 times due to demanding work hours and childcare responsibilities. He is aware of the recommendation to aim for 30 minutes of cardio, 5 days a week, but has not yet implemented this regimen. He expresses interest in establishing a more structured exercise plan, potentially using a  home treadmill and the country club facilities.    OCCUPATIONAL HEALTH:  He reports increased work-related stress due to long and unpredictable hours, which has impacted his ability to maintain a consistent exercise routine and healthy eating habits.    PREVENTIVE CARE:  He received a tetanus vaccine in 2022, providing coverage until 2032. He has not yet received this year's flu vaccine but agrees to receive it today. He has not received the latest COVID vaccine. He had an eye exam in 2022, where he was referred to a retina specialist instead of a general ophthalmologist. The specialist reportedly informed him that his eyes were in excellent condition.      ROS:  Constitutional: -weight loss  Gastrointestinal: -change in bowel habits         Review of Systems   Constitutional:  Negative for activity change and unexpected weight change.   HENT:  Negative for hearing loss, rhinorrhea and trouble swallowing.    Eyes:  Negative for discharge and visual disturbance.   Respiratory:  Negative for chest tightness and wheezing.    Cardiovascular:  Negative for chest pain and palpitations.   Gastrointestinal:  Negative for blood in stool, constipation, diarrhea and vomiting.   Endocrine: Negative for polydipsia and polyuria.   Genitourinary:  Negative for difficulty urinating, hematuria and urgency.   Musculoskeletal:  Negative for arthralgias, joint swelling and neck pain.   Neurological:  Negative for weakness and headaches.   Psychiatric/Behavioral:  Negative for confusion and dysphoric mood.              Objective:      Physical Exam  Vitals reviewed.   Constitutional:       Appearance: He is well-developed.   HENT:      Head: Normocephalic and atraumatic.      Mouth/Throat:      Pharynx: No oropharyngeal exudate.   Eyes:      General: No scleral icterus.        Right eye: No discharge.         Left eye: No discharge.      Pupils: Pupils are equal, round, and reactive to light.   Neck:      Thyroid: No thyromegaly.       Trachea: No tracheal deviation.   Cardiovascular:      Rate and Rhythm: Normal rate and regular rhythm.      Heart sounds: Normal heart sounds. No murmur heard.     No friction rub. No gallop.   Pulmonary:      Effort: Pulmonary effort is normal. No respiratory distress.      Breath sounds: Normal breath sounds. No wheezing or rales.   Chest:      Chest wall: No tenderness.   Abdominal:      General: Bowel sounds are normal. There is no distension.      Palpations: Abdomen is soft. There is no mass.      Tenderness: There is no abdominal tenderness. There is no guarding or rebound.   Musculoskeletal:         General: No tenderness. Normal range of motion.      Cervical back: Normal range of motion and neck supple.   Skin:     General: Skin is warm and dry.      Coloration: Skin is not pale.      Findings: No erythema or rash.   Neurological:      Mental Status: He is alert and oriented to person, place, and time.   Psychiatric:         Behavior: Behavior normal.         Assessment:       1. Annual physical exam  - CBC Auto Differential; Future  - Comprehensive Metabolic Panel; Future  - TSH; Future  - Lipid Panel; Future  - Hemoglobin A1C; Future      Plan:         Assessment & Plan    IMPRESSION:  1. Assessed patient's overall health status during annual exam  2. Reviewed patient's blood pressure (114/76)  3. Evaluated patient's current lifestyle, including diet and exercise habits  4. Determined need for routine health screenings and vaccinations  5. Considered patient's family history of prostate cancer (grandfather) and rare neuroendocrine plasma and esophageal cancer (uncle)    WEIGHT MANAGEMENT:   Emphasized the importance of regular exercise, recommending at least 30 minutes of cardio 5 days a week.   Discussed the benefits of intentional exercise beyond daily activities with children.   Advised on the importance of a balanced diet and reducing fast food consumption.   Mr. Schwab to exercise intentionally,  aiming for 30 minutes of cardio 5 days a week.   Mr. Schwab to coordinate with spouse to create a balanced exercise schedule.   Recommend reducing eating out and fast food consumption.   Mr. Schwab to focus on home-cooked meals.    FLU VACCINATION:   Administered flu vaccine in office.    LABS:   Ordered labs: Complete blood count, electrolytes, kidney function, liver function, blood sugar, thyroid function, cholesterol, and A1c (for diabetes screening).                   This note was generated with the assistance of ambient listening technology. Verbal consent was obtained by the patient and accompanying visitor(s) for the recording of patient appointment to facilitate this note. I attest to having reviewed and edited the generated note for accuracy, though some syntax or spelling errors may persist. Please contact the author of this note for any clarification.

## 2025-03-24 ENCOUNTER — PATIENT MESSAGE (OUTPATIENT)
Dept: INTERNAL MEDICINE | Facility: CLINIC | Age: 35
End: 2025-03-24
Payer: COMMERCIAL